# Patient Record
Sex: FEMALE | Race: WHITE | URBAN - METROPOLITAN AREA
[De-identification: names, ages, dates, MRNs, and addresses within clinical notes are randomized per-mention and may not be internally consistent; named-entity substitution may affect disease eponyms.]

---

## 2020-12-15 ENCOUNTER — OFFICE VISIT (OUTPATIENT)
Dept: PRIMARY CARE CLINIC | Age: 25
End: 2020-12-15
Payer: COMMERCIAL

## 2020-12-15 VITALS
BODY MASS INDEX: 22.73 KG/M2 | OXYGEN SATURATION: 98 % | WEIGHT: 150 LBS | TEMPERATURE: 98.6 F | HEIGHT: 68 IN | HEART RATE: 72 BPM | RESPIRATION RATE: 16 BRPM | SYSTOLIC BLOOD PRESSURE: 108 MMHG | DIASTOLIC BLOOD PRESSURE: 60 MMHG

## 2020-12-15 LAB
Lab: ABNORMAL
QC PASS/FAIL: ABNORMAL
SARS-COV-2, POC: DETECTED

## 2020-12-15 PROCEDURE — 87426 SARSCOV CORONAVIRUS AG IA: CPT | Performed by: INTERNAL MEDICINE

## 2020-12-15 PROCEDURE — 99213 OFFICE O/P EST LOW 20 MIN: CPT | Performed by: INTERNAL MEDICINE

## 2020-12-15 RX ORDER — NORETHINDRONE ACETATE AND ETHINYL ESTRADIOL, AND FERROUS FUMARATE 1MG-20(24)
1 KIT ORAL DAILY
COMMUNITY
Start: 2020-05-11

## 2020-12-15 RX ORDER — M-VIT,TX,IRON,MINS/CALC/FOLIC 27MG-0.4MG
1 TABLET ORAL DAILY
COMMUNITY

## 2020-12-15 RX ORDER — VALACYCLOVIR HYDROCHLORIDE 1 G/1
1000 TABLET, FILM COATED ORAL 2 TIMES DAILY
COMMUNITY
Start: 2020-09-09

## 2020-12-15 NOTE — PROGRESS NOTES
Chief Complaint   Sinus Problem (loss of taste, sinus drainage and congestion began about 3 days ago work at the [employer])    History of Present Illness   Source of history provided by:  patient. Cory De Oliveira is a 23 y.o. old female who has a past medical history of: No past medical history on file. Presents to the flu clinic with complaints of congestion, sinus pressure, and loss of taste x 3 days. States symptoms have been worsening since onset. Denies any CP, dyspnea, LE edema, abdominal pain, vomiting, rash, or lethargy. Has been taking claritin without symptomatic relief. Denies any hx of asthma, COPD, or tobacco use. Denies any history of international travel in the past 14 days. Reports contact with individuals with known COVID-19 infection or under investigation for COVID-19 infection. Patient works at the [employer] and several co-workers have tested positive. She lives with her boyfriend who works at the same place. He is currnetly asymptomatic. ROS   Pertinent positives and negatives are stated within HPI, all other systems reviewed and are negative. No past surgical history on file. Social History:  reports that she has never smoked. She has never used smokeless tobacco.  Family History: family history is not on file. Allergies: Latex and Cranberry    Physical Exam      VS:  /60 (Site: Left Upper Arm, Position: Sitting, Cuff Size: Medium Adult)   Pulse 72   Temp 98.6 °F (37 °C) (Temporal)   Resp 16   Ht 5' 8\" (1.727 m)   Wt 150 lb (68 kg)   LMP 12/01/2020 (Approximate)   SpO2 98%   Breastfeeding No   BMI 22.81 kg/m²    Oxygen Saturation Interpretation: Normal.    Constitutional:  Alert, development consistent with age. NAD. Head:  NC/NT. Airway patent. Neck:  Normal ROM. Supple. Lungs: CTAB without wheezes, rales, or rhonchi. CV:  Regular rate and rhythm, normal heart sounds, without pathological murmurs, ectopy, gallops, or rubs.   Skin:  Normal

## 2020-12-15 NOTE — PATIENT INSTRUCTIONS
Patient Education        Learning About Coronavirus [phone])  Coronavirus [phone]): Overview  What is coronavirus ([id number])? The coronavirus disease (COVID-19) is caused by a virus. It is an illness that was first found in December 2019. It has since spread worldwide. The virus can cause fever, cough, and trouble breathing. In severe cases, it can cause pneumonia and make it hard to breathe without help. It can cause death. This virus spreads person-to-person through droplets from coughing and sneezing. It can also spread when you are close to someone who is infected. And it can spread when you touch something that has the virus on it, such as a doorknob or a tabletop. Coronaviruses are a large group of viruses. They cause the common cold. They also cause more serious illnesses like Middle East respiratory syndrome (MERS) and severe acute respiratory syndrome (SARS). COVID-19 is caused by a novel coronavirus. That means it's a new type that has not been seen in people before. How is COVID-19 treated? Mild illness can be treated at home, but more serious illness needs to be treated in the hospital. Treatment may include medicines to reduce symptoms, plus breathing support such as oxygen therapy or a ventilator. Other treatments, such as antiviral medicines, may help people who have COVID-19. What can you do to protect yourself from COVID-19? The best way to protect yourself from getting sick is to:  · Avoid areas where there is an outbreak. · Avoid contact with people who may be infected. · Avoid crowds and try to stay at least 6 feet away from other people. · Wash your hands often, especially after you cough or sneeze. Use soap and water, and scrub for at least 20 seconds. If soap and water aren't available, use an alcohol-based hand . · Avoid touching your mouth, nose, and eyes. What can you do to avoid spreading the virus to others?   To help avoid spreading the virus to others:  · [employer] clearly. · Your face and lips have a blue color. · You passed out (lost consciousness) or are very hard to wake up. Call your doctor now if you develop symptoms such as:  · Shortness of breath. · Fever. · Cough. If you need to get care, call ahead to the doctor's office for instructions before you go. Make sure you wear a face cover to prevent exposing other people to the virus. Where can you get the latest information? The following health organizations are tracking and studying this virus. Their websites contain the most up-to-date information. Arielle Oshea also learn what to do if you think you may have been exposed to the virus. · U.[place]. Centers for Disease Control and Prevention (CDC): The CDC provides updated news about the disease and travel advice. The website also tells you how to prevent the spread of infection. www.cdc.gov  · World Health Organization [facility]): WHO offers information about the virus outbreaks. WHO also has travel advice. www.who.int  Current as of: July 10, 2020               Content Version: 12.6  © 2006-2020 [employer]. Care instructions adapted under license by [facility] [employer] [facility] ([facility]). If you have questions about a medical condition or this instruction, always ask your healthcare professional. [address] any warranty or liability for your use of this information. Patient Education        Coronavirus ([id number]-25): Care Instructions  Overview  The coronavirus disease (COVID-19) is caused by a virus. Symptoms may include a fever, a cough, and shortness of breath. It mainly spreads person-to-person through droplets from coughing and sneezing. The virus also can spread when people are in close contact with someone who is infected. Most people have mild symptoms and can take care of themselves at home.  If their symptoms get worse, they may need care in a hospital. Treatment may include medicines to reduce symptoms, plus breathing support such as oxygen therapy completely. It's okay to wash other people's laundry with yours. · Clean and disinfect your home every day. Use household  and disinfectant wipes or sprays. Take special care to clean things that you grab with your hands. These include doorknobs, remote controls, phones, and handles on your refrigerator and microwave. And don't forget countertops, tabletops, bathrooms, and computer keyboards. When you can end self-isolation  · If you know or suspect that you have COVID-19, stay in self-isolation until:  ? You haven't had a fever for 3 days, and  ? Your symptoms have improved, and  ? It's been at least 10 days since your symptoms started. · Talk to your doctor about whether you also need testing, especially if you have a weakened immune system. When should you call for help? Call 911 anytime you think you may need emergency care. For example, call if you have life-threatening symptoms, such as:    · You have severe trouble breathing. (You can't talk at all.)     · You have constant chest pain or pressure.     · You are severely dizzy or lightheaded.     · You are confused or can't think clearly.     · Your face and lips have a blue color.     · You pass out (lose consciousness) or are very hard to wake up. Call your doctor now or seek immediate medical care if:    · You have moderate trouble breathing. (You can't speak a full sentence.)     · You are coughing up blood (more than about 1 teaspoon).     · You have signs of low blood pressure. These include feeling lightheaded; being too weak to stand; and having cold, pale, clammy skin. Watch closely for changes in your health, and be sure to contact your doctor if:    · Your symptoms get worse.     · You are not getting better as expected. Call before you go to the doctor's office. Follow their instructions. And wear a cloth face cover. Current as of: July 10, 2020               Content Version: 12.6  © 8737-2187 [employer].    Care instructions adapted under license by [facility] [place] ([facility]). If you have questions about a medical condition or this instruction, always ask your healthcare professional. [address] any warranty or liability for your use of this information.